# Patient Record
Sex: MALE | Race: WHITE | URBAN - METROPOLITAN AREA
[De-identification: names, ages, dates, MRNs, and addresses within clinical notes are randomized per-mention and may not be internally consistent; named-entity substitution may affect disease eponyms.]

---

## 2019-06-21 ENCOUNTER — TELEPHONE (OUTPATIENT)
Dept: TRANSPLANT | Facility: CLINIC | Age: 19
End: 2019-06-21

## 2019-06-21 DIAGNOSIS — Z00.5 TRANSPLANT DONOR EVALUATION: Primary | ICD-10-CM

## 2019-06-21 NOTE — TELEPHONE ENCOUNTER
"[facility] B[facility]ZE  [id number]      LIVING KIDNEY DONOR EVALUATION  Donor First Name Cristian Donor MRN    Donor Last Name Moris Completed 2019 9:25 PM    2000 Record ID [id number]   BREEZE Screen PASSED     Intended Recipient  Recipient First Name Daron Recipient MRN    Recipient Last Name Rustam Relationship Cousin   Recipient  1991 Recipient Diagnosis    Recipient's ABO      Donor Information  Age 19 Gender Male   Ht 191 cm (6' 3'') Race    Wt 120.2 kg (265 lbs) Ethnicity Not /   BMI 33.10 kg/m  Preferred Language English      Required No     Blood Type O   Demographics  Home Address [address] [place] [address] # [phone]   [facility] [place]   State [place] Alternate # [phone]   Zip Code [address] Type Home   Country [place] Preferred Contact day Fri   Email [email or web address] Preferred Contact time 1:00 PM-4:00 PM   &&   Donor's Medical Information  Medical History None Reported Medications None Reported   Surgical History None Reported Allergies NKDA   Social History EtOH: Rare (1-2 drinks/year)   Illicit Drug Use: Denies   Tobacco: Denies Self-Reported Functional Status \"I am able to participate in strenuous sports such as swimming, singles tennis, football, basketball, or skiing\"   Family Medical History Cancer (denies)   Diabetes (Father)   Heart Disease (denies)   Hypertension (denies)   Kidney Disease (denies)   Kidney Stones (denies) Exercise Frequency Exercise (>3 times per week)   Review of Organ Systems  Review of Systems Airway or Lungs: No   Blood Disorder: No   Cancer: No   Diabetes,Thyroid,Adrenal,Endocrine Disorder: No   Digestive or Liver: No   Heart or Circulatory System: No   Immune Diseases: No   Kidneys and Bladder: No   Male Health: No   Muscles,Bones,Joints: No   Neuro: No   Psych: No   &&   Donor's Social Information  Marital Status Single Living Accommodation Lives in rented accommodation   Level of " Education Some college education Living Arrangement With spouse   Employment Status Part Time Concerns: health and life insurance Yes   Employer [employer] Concerns: job security and lost income Yes   Occupation      Medical Insurance Status Has medical insurance     High Risk Behavior  High Risk Behaviors Blood transfusion < 12 months. (NO)   Commercial sex < 12 months. (NO)   Illicit IV drug use < 5yrs. (NO)   Male:male sexual contact < 5yrs. (NO)   Other high risk sexual contact < 12 months. (NO)   Reason for Donation  Referral Friend or Family of [place] Candidate Reason for Donation Marino needs a kidney, and I have one to spare.   Permission to Disclose Inquiry Yes Patient Comments    Donor Motivation Level Ready to start evaluation with reservations     PCP Contact  PCP Name    PCP [facility]    PCP State    PCP Phone    Emergency Contact  First Name Kristi First Name Pamela   Last Name Siddharth Last Name Moris   Phone # [phone] Phone # [phone]   Phone Type Mobile Phone Type Mobile   Relationship Spouse Relationship Mother   Office Use  Reviewed By    Reviewed 6/21/2019 1:13 PM   Admin Folder Archive   Comments    Lost for Followup    Extended Comments    BREEZE ID [email or web address]:[id number] survey status completed   Open Activities  Other  Task    Due Date    Comments Is abo O. Thinking over PEP.Lives in [place].Will send forms/orders.   Activity History  Other  Task    Due Date 6/21/2019   Last Modified Date/Time 6/21/2019 1:00 PM   Comments